# Patient Record
Sex: FEMALE | Race: WHITE
[De-identification: names, ages, dates, MRNs, and addresses within clinical notes are randomized per-mention and may not be internally consistent; named-entity substitution may affect disease eponyms.]

---

## 2017-06-15 ENCOUNTER — HOSPITAL ENCOUNTER (EMERGENCY)
Dept: HOSPITAL 17 - PHED | Age: 52
LOS: 1 days | Discharge: HOME | End: 2017-06-16
Payer: COMMERCIAL

## 2017-06-15 VITALS — WEIGHT: 111.33 LBS | HEIGHT: 64 IN | BODY MASS INDEX: 19.01 KG/M2

## 2017-06-15 VITALS
SYSTOLIC BLOOD PRESSURE: 141 MMHG | TEMPERATURE: 98.8 F | RESPIRATION RATE: 16 BRPM | HEART RATE: 76 BPM | OXYGEN SATURATION: 96 % | DIASTOLIC BLOOD PRESSURE: 87 MMHG
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
## 2017-06-15 DIAGNOSIS — S99.921A: Primary | ICD-10-CM

## 2017-06-15 PROCEDURE — 99283 EMERGENCY DEPT VISIT LOW MDM: CPT

## 2017-06-15 PROCEDURE — 29515 APPLICATION SHORT LEG SPLINT: CPT

## 2017-06-15 PROCEDURE — 73630 X-RAY EXAM OF FOOT: CPT

## 2017-06-15 PROCEDURE — E0113 CRUTCH UNDERARM EACH WOOD: HCPCS

## 2017-06-15 NOTE — RADHPO
EXAM DATE/TIME:  06/15/2017 23:18 

 

[facility] COMPARISON:     

No previous studies available for comparison.

 

                     

INDICATIONS :     

Right foot pain for 1 hours after patient fell into pool

                     

 

MEDICAL HISTORY :     

None.          

 

SURGICAL HISTORY :     

None.   

 

ENCOUNTER:     

Initial                                        

 

ACUITY:     

1 day      

 

PAIN SCORE:     

10/10

 

LOCATION:     

Right  dorsal surface of foot

 

FINDINGS:     

Three view examination of the right foot demonstrates no soft tissue swelling, dislocation, or fractu
re.   The tarsal bones appear intact.  The interphalangeal and metatarsophalangeal joints are intact.
  The calcaneus is intact.  Bony mineralization is normal.

 

CONCLUSION:     

1. There is no evidence of acute fracture.  

 

 

 

 Mendoza Frausto MD on Tanja 15, 2017 at 23:51           

Board Certified Radiologist.

 This report was verified electronically.

## 2017-06-15 NOTE — PD
HPI


Chief Complaint:  Injury


Time Seen by Provider:  23:07


Travel History


International Travel<30 days:  No


Contact w/Intl Traveler<30days:  No





History of Present Illness


HPI


Is a well 51 year-old woman who was walking today by the pool when she tripped 

over the dog and ended up in the pool.  She somehow hurt her right foot but she 

is not exactly sure how.  She has pain along the medial surface of the foot.  

She has pain was worse with weightbearing.  She otherwise has felt generally 

well.  No ankle pain.  No knee pain.  No other complaints.





History


Past Medical History


*** Narrative Medical


Asthma


Menopausal:  Yes


:  4


Para:  4





Social History


Alcohol Use:  Yes (BEER WEEKENDS)


Tobacco Use:  Yes (1.5 PPD)





Allergies-Medications


(Allergen,Severity, Reaction):  


Coded Allergies:  


     No Known Allergies (Unverified , 6/15/17)


Reported Meds & Prescriptions





Reported Meds & Active Scripts


Active


Reported


Proair Hfa 8.5 GM Inh (Albuterol Sulfate) 90 Mcg/Act Aer 2 Puff INH Q4-6H PRN


     108 mcg/actuation








Review of Systems


Except as stated in HPI:  all other systems reviewed are Neg





Physical Exam


Narrative


GENERAL: Well-appearing 51 year-old woman, no acute distress.


SKIN: Warm and dry.


CARDIOVASCULAR: Warm and well perfused.


RESPIRATORY: Normal rate and effort.


MUSCULOSKELETAL: Focused examination of the right lower extremity reveals 

grossly normal appearance of the right foot and ankle.  Is no ecchymosis edema 

or swelling or bruising.  She has tenderness to palpation along the medial edge 

of the foot, mostly from the midfoot and distal to the MTP joint.  Is no 

tenderness on the ball of the foot, the calcaneus, or any ankle.  She has full 

range of motion of the foot and ankle.  Foot appears well perfused.


NEUROLOGICAL: Awake and alert.  No gross deficits.





Data


Data


Last Documented VS





Vital Signs








  Date Time  Temp Pulse Resp B/P Pulse Ox O2 Delivery O2 Flow Rate FiO2


 


6/15/17 23:01 98.8 76 16 141/87 96   








Orders





 Foot, Complete ([id number]) (6/15/17 )


Splint Or Brace Apply/Monitor (17 00:02)


Fiberglass Short Leg Splint Ad (17 )








[facility]


Medical Decision Making


Medical Screen Exam Complete:  Yes


Emergency Medical Condition:  Yes


Interpretation(s)


Foot x-rays negative


Differential Diagnosis


Foot injury, contusion, fracture, dislocation, Lisfranc injury, other


Narrative Course


Medical decision making





51 year-old woman with right foot injury.  Pain is mostly in the lateral edge 

of the foot.  Suspect metatarsal fracture.  We'll check x-ray, pain control, 

reassess.  Metacarpal





FINAL: 51 year-old woman with a foot injury, unclear mechanism.  She has 

tenderness over the medial edge of the foot, but some also on the dorsum of the 

foot, and pain with squeeze test.  This could suggest a Lisfranc type injury.  

Conservatively, will place her short leg splint, recommend follow-up with 

podiatry in one week for repeat evaluation.  No weightbearing.





Diagnosis





 Primary Impression:  


 Right foot injury


Referrals:  


Klever Mcintosh DPM


1 week





***Additional Instructions:


Take Naprosyn as needed for pain.





Take Lortab as needed for severe pain.





Keep leg elevated above your heart to reduce swelling.





No weightbearing on the right foot until cleared by podiatry.





Follow-up with Dr. Goldberg with podiatry in this week.


***Med/Other Pt SpecificInfo:  Prescription(s) given


Scripts


Naproxen (Naprosyn)500 Mg Yaf860 Mg PO BID PRN (PAIN SCALE 1 TO 10) #20 TAB


   Prov:Claudio Williamson MD         17 


Hydrocodone-Acetaminophen (Lortab)5-325 Mg Tab1-2 Tab PO Q6H PRN (PAIN) #12 TAB


   Prov:Claudio Williamson MD         17


Disposition:  01 DISCHARGE HOME


Condition:  Stable








Claudio Williamson MD Nahid 15, 2017 23:15

## 2017-06-16 VITALS — SYSTOLIC BLOOD PRESSURE: 142 MMHG | DIASTOLIC BLOOD PRESSURE: 90 MMHG

## 2018-06-05 ENCOUNTER — HOSPITAL ENCOUNTER (OUTPATIENT)
Dept: HOSPITAL 17 - PHSDC | Age: 53
Discharge: HOME | End: 2018-06-05
Attending: PAIN MEDICINE
Payer: COMMERCIAL

## 2018-06-05 DIAGNOSIS — M25.571: Primary | ICD-10-CM

## 2018-06-05 DIAGNOSIS — M25.671: ICD-10-CM

## 2018-06-05 PROCEDURE — 99152 MOD SED SAME PHYS/QHP 5/>YRS: CPT

## 2018-06-05 PROCEDURE — 64640 INJECTION TREATMENT OF NERVE: CPT

## 2018-06-05 NOTE — M6
cc:

TEO Hidalgo MD

****

 

 

DATE:

06/05/2018

 

PROCEDURE:

IV regional block, right foot.

 

History and physical was completed and signed.  Consent was signed.  

Procedure site was marked.  Medications were listed and reconciled.  

Pain score was recorded.  Allergies were noted.  Time out was taken.  

Fluoroscopy time was recorded where applicable.

 

Sedation was administered or directed by Dr. Hidalgo.  The patient 

was given oxygen.  The patient was monitored by a registered nurse.  

Total procedure time was greater than 15 minutes.

 

DESCRIPTION:

An IV was started in the patient's left hand and then another IV was 

started in the patient's right foot.  The patient then was lightly 

sedated using propofol and Versed.  Her right foot was elevated.  A 

blood pressure cuff was placed above the ankle and her foot was 

exsanguinated using an Esmarch bandage. Then the blood pressure cuff 

was inflated to 280 mmHg and through the IV in her foot, the patient 

was given a mixture which contained 10 mL of 1% Xylocaine and 60 mg of

Toradol, 125 mg of Solu-Medrol and 100 mcg of clonidine.  The blood 

pressure cuff on her foot stayed inflated for 30 minutes, during which

time the patient was lightly sedated for comfort.  Then, at the end of

30 minutes, the blood pressure cuff was deflated and inflated 3 times 

over a 3-5 minute period.  The patient had stable vital signs.  She 

was taken to the recovery room and observed for another 45 minutes 

prior to being discharged.

 

 

__________________________________

TEO Hidalgo MD

 

 

WRM/TL

D: 06/05/2018, 07:40 AM

T: 06/05/2018, 08:22 AM

Visit #: [id number]

Job #: [id number]